# Patient Record
Sex: FEMALE | Race: OTHER | HISPANIC OR LATINO | URBAN - METROPOLITAN AREA
[De-identification: names, ages, dates, MRNs, and addresses within clinical notes are randomized per-mention and may not be internally consistent; named-entity substitution may affect disease eponyms.]

---

## 2024-09-17 ENCOUNTER — OUTPATIENT (OUTPATIENT)
Dept: OUTPATIENT SERVICES | Facility: HOSPITAL | Age: 49
LOS: 1 days | End: 2024-09-17
Payer: COMMERCIAL

## 2024-09-17 VITALS
HEART RATE: 62 BPM | RESPIRATION RATE: 16 BRPM | SYSTOLIC BLOOD PRESSURE: 152 MMHG | TEMPERATURE: 98 F | OXYGEN SATURATION: 98 % | WEIGHT: 139.99 LBS | DIASTOLIC BLOOD PRESSURE: 88 MMHG | HEIGHT: 61 IN

## 2024-09-17 DIAGNOSIS — N39.3 STRESS INCONTINENCE (FEMALE) (MALE): ICD-10-CM

## 2024-09-17 DIAGNOSIS — N81.6 RECTOCELE: ICD-10-CM

## 2024-09-17 DIAGNOSIS — Z01.818 ENCOUNTER FOR OTHER PREPROCEDURAL EXAMINATION: ICD-10-CM

## 2024-09-17 DIAGNOSIS — D27.9 BENIGN NEOPLASM OF UNSPECIFIED OVARY: ICD-10-CM

## 2024-09-17 DIAGNOSIS — N81.81 PERINEOCELE: ICD-10-CM

## 2024-09-17 DIAGNOSIS — Z29.9 ENCOUNTER FOR PROPHYLACTIC MEASURES, UNSPECIFIED: ICD-10-CM

## 2024-09-17 LAB
A1C WITH ESTIMATED AVERAGE GLUCOSE RESULT: 5.5 % — SIGNIFICANT CHANGE UP (ref 4–5.6)
ANION GAP SERPL CALC-SCNC: 7 MMOL/L — SIGNIFICANT CHANGE UP (ref 5–17)
APTT BLD: 31.9 SEC — SIGNIFICANT CHANGE UP (ref 24.5–35.6)
BLD GP AB SCN SERPL QL: SIGNIFICANT CHANGE UP
BUN SERPL-MCNC: 9 MG/DL — SIGNIFICANT CHANGE UP (ref 7–23)
CALCIUM SERPL-MCNC: 9.1 MG/DL — SIGNIFICANT CHANGE UP (ref 8.5–10.1)
CHLORIDE SERPL-SCNC: 106 MMOL/L — SIGNIFICANT CHANGE UP (ref 96–108)
CO2 SERPL-SCNC: 24 MMOL/L — SIGNIFICANT CHANGE UP (ref 22–31)
CREAT SERPL-MCNC: 0.59 MG/DL — SIGNIFICANT CHANGE UP (ref 0.5–1.3)
EGFR: 112 ML/MIN/1.73M2 — SIGNIFICANT CHANGE UP
ESTIMATED AVERAGE GLUCOSE: 111 MG/DL — SIGNIFICANT CHANGE UP (ref 68–114)
GLUCOSE SERPL-MCNC: 93 MG/DL — SIGNIFICANT CHANGE UP (ref 70–99)
HCT VFR BLD CALC: 40.3 % — SIGNIFICANT CHANGE UP (ref 34.5–45)
HGB BLD-MCNC: 13.1 G/DL — SIGNIFICANT CHANGE UP (ref 11.5–15.5)
INR BLD: 0.92 RATIO — SIGNIFICANT CHANGE UP (ref 0.85–1.18)
MCHC RBC-ENTMCNC: 28 PG — SIGNIFICANT CHANGE UP (ref 27–34)
MCHC RBC-ENTMCNC: 32.5 GM/DL — SIGNIFICANT CHANGE UP (ref 32–36)
MCV RBC AUTO: 86.1 FL — SIGNIFICANT CHANGE UP (ref 80–100)
NRBC # BLD: 0 /100 WBCS — SIGNIFICANT CHANGE UP (ref 0–0)
PLATELET # BLD AUTO: 299 K/UL — SIGNIFICANT CHANGE UP (ref 150–400)
POTASSIUM SERPL-MCNC: 3.6 MMOL/L — SIGNIFICANT CHANGE UP (ref 3.5–5.3)
POTASSIUM SERPL-SCNC: 3.6 MMOL/L — SIGNIFICANT CHANGE UP (ref 3.5–5.3)
PROTHROM AB SERPL-ACNC: 10.5 SEC — SIGNIFICANT CHANGE UP (ref 9.5–13)
RBC # BLD: 4.68 M/UL — SIGNIFICANT CHANGE UP (ref 3.8–5.2)
RBC # FLD: 19.9 % — HIGH (ref 10.3–14.5)
SODIUM SERPL-SCNC: 137 MMOL/L — SIGNIFICANT CHANGE UP (ref 135–145)
WBC # BLD: 3.48 K/UL — LOW (ref 3.8–10.5)
WBC # FLD AUTO: 3.48 K/UL — LOW (ref 3.8–10.5)

## 2024-09-17 NOTE — H&P PST ADULT - PROBLEM SELECTOR PLAN 5
Instruction regarding chlorhexidine soap use is given.  Patieverbalized understanding.  Literature also given Instruction regarding chlorhexidine soap use is given.  Patient verbalized understanding.  Literature also given

## 2024-09-17 NOTE — H&P PST ADULT - HISTORY OF PRESENT ILLNESS
49 yo female with no significant PMHx reports the above.  She is scheduled for Robotic Assisted Total Laparoscopic Hysterectomy with Bilateral Salpingectomy, Anterior Posterior  Vaginal Repair and Transobturator Tape, on 9/25/24 49 yo female with no significant PMHx reports the above.  Patient thinks she has fibroid and also states that she urinates on herself when sneezes, cough.  She is scheduled for Robotic Assisted Total Laparoscopic Hysterectomy with Bilateral Salpingectomy, Anterior Posterior  Vaginal Repair and Transobturator Tape, on 9/25/24

## 2024-09-17 NOTE — H&P PST ADULT - NSICDXPROCEDURE_GEN_ALL_CORE_FT
PROCEDURES:  Laparoscopic total hysterectomy with salpingectomy for uterus 250 grams or less 17-Sep-2024 12:34:50  Reina Vang  Anterior and posterior vaginal repair 17-Sep-2024 12:36:15  Reina Vang  Insertion, transobturator tape 17-Sep-2024 12:38:07  Reina Vang

## 2024-09-17 NOTE — H&P PST ADULT - PROBLEM SELECTOR PLAN 2
Patient's Name: Kasey Rosales  MRN: [id number]    YOB: 1984    Date of Service: 12/2/2022   Age: 38 yrs      CHIEF COMPLAINT: Follow-up (4 week f/u - right sided abdominal pain )    I had the pleasure of seeing Kasey Rosales in the [facility] outpatient office today for a follow up visit.  I last evaluated her on 11/1/2022.  Since that time she has tried amitriptyline without success.  Medication made her dizzy.  She discontinuation and pain and other symptoms have returned to baseline.  She continues to have pain in the proximal right thigh and right flank.  She does endorse pain into the right groin with activity.  On examination she does lean to the right upon heel strike in his landing harder on that right foot.  The patient is aware of this gait deviation.  I advised at this time I have concern for intra-articular hip pathology with referred pain through the psoas muscle.  I recommend she undergo diagnostic/therapeutic right hip joint injection at this time.    REVIEW OF SYSTEMS: Denies any bowel or bladder issues, falls, saddle anesthesia, fevers, or new muscle spasms.    PAST MEDICAL, SOCIAL and FAMILY HISTORY: Reviewed, noted, and otherwise unchanged.     ALLERGIES:    ALLERGIES:   Allergen Reactions   • Naprosyn [Naproxen] RASH and SHORTNESS OF BREATH   • Amoxicillin RASH and Nausea & Vomiting   • Bee Sting SWELLING     A lot of swelling at the site of sting.   • Flu Virus Vaccine RASH   • Macrobid [Nitrofurantoin Monohydrate Macrocrystals]    • Monsels Ferric Subsulfate [Ferric Subsulfate] SWELLING and Other (See Comments)     Used after a colposcopy and patient developed vaginal sores from it        MEDICATIONS:    Current Outpatient Medications   Medication Sig Dispense Refill   • pantoprazole (PROTONIX) 40 MG tablet Take 1 tablet by mouth in the morning and 1 tablet in the evening. 180 tablet 1   • electrolyte/PEG 3350 (NULYTELY) 420 g solution      • Insulin Pen Needle 32G X 6 MM Misc As  directed for injection 30 each 11   • liraglutide - weight management (SAXENDA) 18 MG/3ML pen-injector Inject 0.6 mg into the skin once daily for 1 week; increase by 0.6 mg daily at weekly intervals to a target dose of 3 mg once daily 6 mL 2   • Cholecalciferol (Vitamin D3) 50 mcg (2,000 units) capsule Take 1 capsule by mouth daily. Start after completion of high dose therapy, long term supplement.     • EPINEPHrine 0.3 MG/0.3ML auto-injector Inject 0.3 mg into the muscle.     • ondansetron (ZOFRAN ODT) 4 MG disintegrating tablet DISSOLVE ONE TABLET ON THE TONGUE FOUR TIMES DAILY AS NEEDED FOR NAUSEA AND/OR VOMITING     • ibuprofen (MOTRIN) 800 MG tablet Take 800 mg by mouth in the morning and 800 mg in the evening.     • rizatriptan (MAXALT) 5 MG tablet Take 5 mg by mouth as needed for Migraine. Take 1 tablet by mouth at onset of migraine. May repeat after 2 hours if needed.       No current facility-administered medications for this visit.        PHYSICAL EXAM:    Blood pressure 132/60, pulse 97, height 5' 10\" (1.778 m), weight (!) 164.7 kg (363 lb), last menstrual period 10/14/2022, SpO2 97 %. Body mass index is 52.09 kg/m².  General: NAD, well hydrated, well nourished, adult female.  Psych: Mood and affect appropriate.   Eyes: No scleral icterus, EOMI.   Respiratory: Respiratory effort normal, no retractions.   Vascular exam: No significant lower extremity edema, limbs normal temp. Calves are soft and non tender,.   Skin: Skin turgor is normal, without erythema.   Neurologic exam:                  Sensation: Intact to light touch.             DTR's: 2+ and symmetric in the UEs, 1+ and symmetric in the LEs             UMN Findings: There were no Lopez’s signs. Toes were downgoing. No ankle clonus.             Motor Exam:                        RUE:   D 5/5    B 5/5    T 5/5    WE 5/5    HI 5/5                        LUE:   D 5/5    B 5/5    T 5/5    WE 5/5    HI 5/5                        RLE:   HF 4+/5 with  pain in the right hip and right lower quadrant of the abdomen   KE 5/5    DF 5/5    EHL 5/5    TF 5/5                        LLE:    HF 5/5    KE 5/5    DF 5/5    EHL 5/5    TF 5/5  MSK exam:              Full nonpainful ROM bilat shoulders and decreased painful range-of-motion of the right hip with pain radiating to the groin with hip flexion and internal rotation as well as reproduction of lower quadrant pain  Gait: Patient has right trunk lean on heel strike through mid stance             Hamstring and hip flexor ROM decreased  Spine exam:   Spinal lordosis is increased in the lumbar spine and kyphosis within normal range.  No scoliosis  No pelvic obliquity.  No spinal percussion tenderness.  No palpation tenderness.  SI joints non painful to palpation.  Cervical/thoracic/lumbar ROM is restricted by body habitus  - Standing flexion test.  Pain was not exacerbated by facet loading.  Neg lumbar/cervical Spurling's bilat  - Straight leg raise bilat      ASSESSMENT:  1. Right hip pain    2. Lower abdominal pain        RECOMMENDATIONS:    · I educated her on the anatomy, pathophysiology, and biomechanics of the disease process.  · I reviewed the options for further diagnostic work up in detail.  · I reviewed the conservative and interventional treatment options in detail.  · Red flags were reviewed and she voiced understanding.  · All questions answered.  · Schedule right hip joint injection  · Follow up in the office in 3 weeks.    Thank you for allowing me to participate in this patient's care.    Jimbo Hull, DO   Physical Medicine & Rehabilitation       Robotic Assisted Total Laparoscopic Hysterectomy with Bilateral Salpingectomy, Anterior Posterior  Vaginal Repair and Transobturator Tape, on 9/25/24 Robotic Assisted Total Laparoscopic Hysterectomy with Bilateral Salpingectomy, Anterior Posterior  Vaginal Repair and Transobturator Tape

## 2024-09-17 NOTE — H&P PST ADULT - NSANTHOSAYNRD_GEN_A_CORE
No. KIA screening performed.  STOP BANG Legend: 0-2 = LOW Risk; 3-4 = INTERMEDIATE Risk; 5-8 = HIGH Risk

## 2024-09-17 NOTE — H&P PST ADULT - RESPIRATORY
clear to auscultation bilaterally/no respiratory distress/respirations non-labored clear to auscultation bilaterally/no respiratory distress

## 2024-09-17 NOTE — H&P PST ADULT - TOBACCO USE
Regular rate and rhythm, Heart sounds S1 S2 present, no murmurs, rubs or gallops
Unknown if ever smoked

## 2024-09-17 NOTE — H&P PST ADULT - ASSESSMENT
49 yo female is scheduled for : Robotic Assisted Total Laparoscopic Hysterectomy with Bilateral Salpingectomy, Anterior Posterior  Vaginal Repair and Transobturator Tape, on 9/25/24

## 2024-09-17 NOTE — H&P PST ADULT - PROBLEM SELECTOR PLAN 3
Robotic Assisted Total Laparoscopic Hysterectomy with Bilateral Salpingectomy, Anterior Posterior  Vaginal Repair and Transobturator Tape, on 9/25/24 Robotic Assisted Total Laparoscopic Hysterectomy with Bilateral Salpingectomy, Anterior Posterior  Vaginal Repair and Transobturator Tape

## 2024-09-17 NOTE — H&P PST ADULT - PROBLEM SELECTOR PLAN 1
Robotic Assisted Total Laparoscopic Hysterectomy with Bilateral Salpingectomy, Anterior Posterior  Vaginal Repair and Transobturator Tape, on 9/25/24      STOP BANG : Robotic Assisted Total Laparoscopic Hysterectomy with Bilateral Salpingectomy, Anterior Posterior  Vaginal Repair and Transobturator Tape      STOP BANG : 0 Low risk for KIA complications

## 2024-09-18 PROCEDURE — 83036 HEMOGLOBIN GLYCOSYLATED A1C: CPT

## 2024-09-18 PROCEDURE — 86901 BLOOD TYPING SEROLOGIC RH(D): CPT

## 2024-09-18 PROCEDURE — G0463: CPT

## 2024-09-18 PROCEDURE — 86900 BLOOD TYPING SEROLOGIC ABO: CPT

## 2024-09-18 PROCEDURE — 36415 COLL VENOUS BLD VENIPUNCTURE: CPT

## 2024-09-18 PROCEDURE — 93005 ELECTROCARDIOGRAM TRACING: CPT

## 2024-09-18 PROCEDURE — 86850 RBC ANTIBODY SCREEN: CPT

## 2024-09-18 PROCEDURE — 85730 THROMBOPLASTIN TIME PARTIAL: CPT

## 2024-09-18 PROCEDURE — 85027 COMPLETE CBC AUTOMATED: CPT

## 2024-09-18 PROCEDURE — 85610 PROTHROMBIN TIME: CPT

## 2024-09-18 PROCEDURE — 80048 BASIC METABOLIC PNL TOTAL CA: CPT

## 2024-09-25 ENCOUNTER — OUTPATIENT (OUTPATIENT)
Dept: OUTPATIENT SERVICES | Facility: HOSPITAL | Age: 49
LOS: 1 days | End: 2024-09-25
Payer: COMMERCIAL

## 2024-09-25 VITALS
HEIGHT: 61 IN | TEMPERATURE: 98 F | OXYGEN SATURATION: 100 % | RESPIRATION RATE: 16 BRPM | DIASTOLIC BLOOD PRESSURE: 70 MMHG | SYSTOLIC BLOOD PRESSURE: 111 MMHG | WEIGHT: 139.99 LBS | HEART RATE: 68 BPM

## 2024-09-25 VITALS
TEMPERATURE: 98 F | SYSTOLIC BLOOD PRESSURE: 130 MMHG | DIASTOLIC BLOOD PRESSURE: 70 MMHG | HEART RATE: 74 BPM | RESPIRATION RATE: 16 BRPM | OXYGEN SATURATION: 99 %

## 2024-09-25 DIAGNOSIS — N81.6 RECTOCELE: ICD-10-CM

## 2024-09-25 DIAGNOSIS — N39.3 STRESS INCONTINENCE (FEMALE) (MALE): ICD-10-CM

## 2024-09-25 DIAGNOSIS — Z01.818 ENCOUNTER FOR OTHER PREPROCEDURAL EXAMINATION: ICD-10-CM

## 2024-09-25 DIAGNOSIS — N81.81 PERINEOCELE: ICD-10-CM

## 2024-09-25 DIAGNOSIS — D27.9 BENIGN NEOPLASM OF UNSPECIFIED OVARY: ICD-10-CM

## 2024-09-25 LAB
BLD GP AB SCN SERPL QL: SIGNIFICANT CHANGE UP
HCG UR QL: NEGATIVE — SIGNIFICANT CHANGE UP

## 2024-09-25 PROCEDURE — 88307 TISSUE EXAM BY PATHOLOGIST: CPT | Mod: 26

## 2024-09-25 PROCEDURE — 81025 URINE PREGNANCY TEST: CPT

## 2024-09-25 PROCEDURE — 58571 TLH W/T/O 250 G OR LESS: CPT | Mod: AS

## 2024-09-25 PROCEDURE — S2900 ROBOTIC SURGICAL SYSTEM: CPT

## 2024-09-25 PROCEDURE — 36415 COLL VENOUS BLD VENIPUNCTURE: CPT

## 2024-09-25 PROCEDURE — 57288 REPAIR BLADDER DEFECT: CPT

## 2024-09-25 PROCEDURE — 56501 DESTROY VULVA LESIONS SIM: CPT

## 2024-09-25 PROCEDURE — S2900: CPT

## 2024-09-25 PROCEDURE — 57425 LAPAROSCOPY SURG COLPOPEXY: CPT

## 2024-09-25 PROCEDURE — 57260 CMBN ANT PST COLPRHY: CPT | Mod: AS

## 2024-09-25 PROCEDURE — 86923 COMPATIBILITY TEST ELECTRIC: CPT

## 2024-09-25 PROCEDURE — 58571 TLH W/T/O 250 G OR LESS: CPT

## 2024-09-25 PROCEDURE — 86900 BLOOD TYPING SEROLOGIC ABO: CPT

## 2024-09-25 PROCEDURE — C1771: CPT

## 2024-09-25 PROCEDURE — 86850 RBC ANTIBODY SCREEN: CPT

## 2024-09-25 PROCEDURE — 86901 BLOOD TYPING SEROLOGIC RH(D): CPT

## 2024-09-25 PROCEDURE — 88307 TISSUE EXAM BY PATHOLOGIST: CPT

## 2024-09-25 PROCEDURE — 57288 REPAIR BLADDER DEFECT: CPT | Mod: AS

## 2024-09-25 PROCEDURE — 58662 LAPAROSCOPY EXCISE LESIONS: CPT

## 2024-09-25 DEVICE — SLING FEMALE DESARA ONE SINGLE INCISION: Type: IMPLANTABLE DEVICE | Status: FUNCTIONAL

## 2024-09-25 RX ORDER — SODIUM CHLORIDE IRRIG SOLUTION 0.9 %
1000 SOLUTION, IRRIGATION IRRIGATION
Refills: 0 | Status: DISCONTINUED | OUTPATIENT
Start: 2024-09-25 | End: 2024-09-25

## 2024-09-25 RX ORDER — CHLORHEXIDINE GLUCONATE ORAL RINSE 1.2 MG/ML
1 SOLUTION DENTAL ONCE
Refills: 0 | Status: COMPLETED | OUTPATIENT
Start: 2024-09-25 | End: 2024-09-25

## 2024-09-25 RX ORDER — SODIUM CHLORIDE 0.9 % (FLUSH) 0.9 %
3 SYRINGE (ML) INJECTION EVERY 8 HOURS
Refills: 0 | Status: DISCONTINUED | OUTPATIENT
Start: 2024-09-25 | End: 2024-09-25

## 2024-09-25 RX ORDER — FENTANYL CITRATE-0.9 % NACL/PF 300MCG/30
25 PATIENT CONTROLLED ANALGESIA VIAL INJECTION
Refills: 0 | Status: DISCONTINUED | OUTPATIENT
Start: 2024-09-25 | End: 2024-09-25

## 2024-09-25 RX ORDER — ACETAMINOPHEN 325 MG
650 TABLET ORAL EVERY 6 HOURS
Refills: 0 | Status: DISCONTINUED | OUTPATIENT
Start: 2024-09-25 | End: 2024-10-09

## 2024-09-25 RX ORDER — FENTANYL CITRATE-0.9 % NACL/PF 300MCG/30
50 PATIENT CONTROLLED ANALGESIA VIAL INJECTION
Refills: 0 | Status: DISCONTINUED | OUTPATIENT
Start: 2024-09-25 | End: 2024-09-25

## 2024-09-25 RX ADMIN — CHLORHEXIDINE GLUCONATE ORAL RINSE 1 APPLICATION(S): 1.2 SOLUTION DENTAL at 08:04

## 2024-09-25 NOTE — ASU DISCHARGE PLAN (ADULT/PEDIATRIC) - NS MD DC FALL RISK RISK
For information on Fall & Injury Prevention, visit: [email or web address] OR  [email or web address] OR  https://www.cdc.gov/steadi/patient.html

## 2024-09-25 NOTE — BRIEF OPERATIVE NOTE - NSICDXBRIEFPROCEDURE_GEN_ALL_CORE_FT
PROCEDURES:  Robot-assisted laparoscopic total hysterectomy with cystoscopy 25-Sep-2024 15:07:39  Begun, [place]  Bilateral salpingectomy 25-Sep-2024 15:07:51  Begun, [place]  Lysis of adhesions, pelvic 25-Sep-2024 15:08:03  Begun, [place]  Anterior and posterior vaginal repair 25-Sep-2024 15:08:14  Begun, [place]  Colporrhaphy, posterior, for enterocele repair 25-Sep-2024 15:08:27  Begun, [place]  Uterosacral colposuspension 25-Sep-2024 15:08:39  Begun, [place]  Placement, transobturator tape 25-Sep-2024 15:09:47  Begun, [place]  Block, transversus abdominis plane, bilateral 25-Sep-2024 15:10:16  Begun, [place]  Destruction, lesion, vulva 25-Sep-2024 15:13:27  Jose, [place]

## 2024-09-25 NOTE — ASU PATIENT PROFILE, ADULT - REASON FOR ADMISSION, PROFILE
scheduled for Robotic Assisted Total Laparoscopic Hysterectomy with Bilateral Salpingectomy, Anterior Posterior  Vaginal Repair and Transobturator Tape, on 9/25/24

## 2024-09-25 NOTE — ASU PREOP CHECKLIST - SITE MARKED BY ANESTHESIOLOGIST
Please call the [facility] at [phone], select OPTION #2,  if any of your medications change.  This means: if a med is discontinued, a new med is started, or a dose is changed.  These meds may interact with your warfarin and we may need to adjust your dose.  This is especially important if you start any type of ANTIBIOTIC.    Also, please call if you have any admissions to the hospital, visits to an emergency room, procedures planned, or if any other medical provider has instructed you to hold your warfarin.    
n/a

## 2024-09-25 NOTE — BRIEF OPERATIVE NOTE - NSICDXBRIEFPOSTOP_GEN_ALL_CORE_FT
POST-OP DIAGNOSIS:  Stress incontinence, female 25-Sep-2024 15:11:50  Jose, [place]  Benign neoplasm of left ovary 25-Sep-2024 15:12:08  Jose, [place]  Perineocele 25-Sep-2024 15:11:34  Jose, [place]  Rectocele 25-Sep-2024 15:11:42  Virginia Garcia

## 2024-09-25 NOTE — ASU PATIENT PROFILE, ADULT - FALL HARM RISK - UNIVERSAL INTERVENTIONS
Bed in lowest position, wheels locked, appropriate side rails in place/Call bell, personal items and telephone in reach/Instruct patient to call for assistance before getting out of bed or chair/Non-slip footwear when patient is out of bed/[place] to call system/Physically safe environment - no spills, clutter or unnecessary equipment/Purposeful Proactive Rounding/Room/bathroom lighting operational, light cord in reach

## 2024-09-25 NOTE — ASU DISCHARGE PLAN (ADULT/PEDIATRIC) - CARE PROVIDER_API CALL
Jose Collins  Obstetrics and Gynecology  [address] [address] [place] [id number]  Phone: [phone]  Fax: [phone]  Follow Up Time: 2 weeks

## 2024-09-25 NOTE — ASU DISCHARGE PLAN (ADULT/PEDIATRIC) - PROCEDURE
Status Post RA Laparoscopic total Hysterectomy with bilateral salpingectomy, left paratubal cystectomy, KOURTNEY, Anterior and posterior vaginal repair, posterior enterocele repair, uterosacral colposuspension, transobturator tape sling, cystoscopy, bilateral TAP block, destruction of multiple vulvar lesions

## 2024-09-25 NOTE — CHART NOTE - NSCHARTNOTEFT_GEN_A_CORE
Patient seen and evaluated at bedside, resting comfortably. Pain controlled with pain meds. Tolerant of  slips of clear liquid diet. Denies n/v, fever, dizziness, chills, sob, chest pain, or any other concern.    Vital Signs Last 24 Hrs  T(C): 36.4 (25 Sep 2024 14:56), Max: 36.7 (25 Sep 2024 08:12)  T(F): 97.5 (25 Sep 2024 14:56), Max: 98.1 (25 Sep 2024 08:12)  HR: 73 (25 Sep 2024 15:56) (66 - 74)  BP: 116/67 (25 Sep 2024 15:56) (109/70 - 141/119)  BP(mean): 81 (25 Sep 2024 15:56) (81 - 129)  RR: 19 (25 Sep 2024 15:56) (14 - 19)  SpO2: 100% (25 Sep 2024 15:56) (100% - 100%)    Parameters below as of 25 Sep 2024 15:56  Patient On (Oxygen Delivery Method): room air        exam  gen: nad, aa+o x 3  abd: soft, non tender, non distended. dressing in place, clean, dry and intact  gyn: no vaginal bleeding      a/p  47 yo F s/p robotic assisted total hysterectomy with cystoscopy, POD 0, currently stable  - f/u trial void  - pain management as need  -  regular diet  - venodyne/ incentive spirometer  -continue post op care    Dr Collins notified

## 2024-09-25 NOTE — BRIEF OPERATIVE NOTE - NSICDXBRIEFPREOP_GEN_ALL_CORE_FT
PRE-OP DIAGNOSIS:  Stress incontinence, female 25-Sep-2024 15:10:54  Virginia Garcia  Perineocele 25-Sep-2024 15:11:26  Jose, [place]  Rectocele 25-Sep-2024 15:11:19  Jose, [place]  Benign neoplasm of left ovary 25-Sep-2024 15:11:07  Virginia Garcia

## 2024-12-23 NOTE — H&P PST ADULT - NEGATIVE SKIN SYMPTOMS
Take medications as prescribed.  Maintain adequate hydration and nutrition.  If symptoms worsen, do not improve, or any other concerning or worrisome symptoms develop please return to the clinic or go to the emergency department.  Call referral number today to schedule follow-up with dermatology.   no rash/no itching/no dryness/no brittle nails

## (undated) DEVICE — PREP BETADINE KIT

## (undated) DEVICE — DRSG MASTISOL

## (undated) DEVICE — WARMING BLANKET UPPER ADULT

## (undated) DEVICE — DRAPE ROBOTIC

## (undated) DEVICE — FOLEY TRAY 16FR 5CC LTX UMETER CLOSED

## (undated) DEVICE — POSITIONER FOAM EGG CRATE ULNAR 2PCS (PINK)

## (undated) DEVICE — LUBRICATING JELLY ONESHOT 1.25OZ

## (undated) DEVICE — SUT VLOC 90 2-0 6" GS-22 VIOLET

## (undated) DEVICE — SOL INJ NS 0.9% 100ML

## (undated) DEVICE — Device

## (undated) DEVICE — PACK PERI GYN

## (undated) DEVICE — XI DRAPE ARM

## (undated) DEVICE — PACK ROBOTIC LIJ

## (undated) DEVICE — GLV 6.5 PROTEXIS (WHITE)

## (undated) DEVICE — POSITIONER PINK PAD PIGAZZI SYSTEM

## (undated) DEVICE — D HELP - CLEARVIEW CLEARIFY SYSTEM

## (undated) DEVICE — SOL IRR POUR H2O 250ML

## (undated) DEVICE — XI SEAL UNIV 5- 8 MM

## (undated) DEVICE — SYR ASEPTO

## (undated) DEVICE — SOL IRR POUR H2O 1000ML

## (undated) DEVICE — SOL IRR BAG NS 0.9% 3000ML

## (undated) DEVICE — SPECIMEN CONTAINER 100ML

## (undated) DEVICE — LONE STAR RETRACTOR SQUARE 14.1CMX14.1CM DISP

## (undated) DEVICE — XI VESSEL SEALER

## (undated) DEVICE — DRAPE INSTRUMENT POUCH 6.75" X 11"

## (undated) DEVICE — DRSG STERISTRIPS 0.5 X 4"

## (undated) DEVICE — XI DRAPE COLUMN

## (undated) DEVICE — ELCTR CORD FOOTSWITCH 1PLR LAPSCP 10FT

## (undated) DEVICE — SUT VICRYL PLUS 2-0 36" CT-1 UNDYED

## (undated) DEVICE — TUBING AIRSEAL TRI-LUMEN FILTERED

## (undated) DEVICE — SOL IRR POUR NS 0.9% 1000ML

## (undated) DEVICE — XI TIP COVER

## (undated) DEVICE — XI OBTURATOR OPTICAL BLADELESS 8MM

## (undated) DEVICE — DRAPE IRRIGATION POUCH 19X23"

## (undated) DEVICE — GLV 7.5 PROTEXIS (WHITE)

## (undated) DEVICE — FOLEY TRAY 16FR SURESTEP LTX BG

## (undated) DEVICE — PACKING GAUZE 2" X 3YD

## (undated) DEVICE — POSITIONER PURPLE ARM ONE STEP (LARGE)

## (undated) DEVICE — ELCTR GROUNDING PAD ADULT COVIDIEN

## (undated) DEVICE — POSITIONER FOAM HEAD CRADLE (PINK)

## (undated) DEVICE — SOL IRR POUR NS 0.9% 1500ML

## (undated) DEVICE — SOL IRR POUR NS 0.9% 500ML

## (undated) DEVICE — VENODYNE/SCD SLEEVE CALF MEDIUM

## (undated) DEVICE — LONE STAR ELASTIC STAY HOOK 5MM SHARP

## (undated) DEVICE — TRAY SPINAL EPIDURAL 5ML 27G

## (undated) DEVICE — TUBING STRYKEFLOW II SUCTION / IRRIGATOR

## (undated) DEVICE — GLV 6 PROTEXIS (BLUE)

## (undated) DEVICE — SUT VICRYL PLUS 0 27" CT-1 UNDYED

## (undated) DEVICE — GOWN XL

## (undated) DEVICE — SUT VICRYL PLUS 4-0 27" PS-2 UNDYED

## (undated) DEVICE — DRAPE LEGGINGS 6" CUFF 28X43"

## (undated) DEVICE — DRAPE ADAPTER ALLY UPS FOR ADVINCULA DELINEATOR